# Patient Record
(demographics unavailable — no encounter records)

---

## 2024-11-13 NOTE — HISTORY OF PRESENT ILLNESS
[de-identified] : S/T X few days, afebrile, nasal congestion and coughing [FreeTextEntry6] : ST x 4-5 days with cough and congestion. No fever, HA or abdominal pain.  No known sick contacts.